# Patient Record
Sex: FEMALE | Race: WHITE | URBAN - METROPOLITAN AREA
[De-identification: names, ages, dates, MRNs, and addresses within clinical notes are randomized per-mention and may not be internally consistent; named-entity substitution may affect disease eponyms.]

---

## 2017-06-23 ENCOUNTER — IMPORTED ENCOUNTER (OUTPATIENT)
Dept: URBAN - METROPOLITAN AREA CLINIC 50 | Facility: CLINIC | Age: 67
End: 2017-06-23

## 2017-10-06 ENCOUNTER — IMPORTED ENCOUNTER (OUTPATIENT)
Dept: URBAN - METROPOLITAN AREA CLINIC 50 | Facility: CLINIC | Age: 67
End: 2017-10-06

## 2017-10-18 ENCOUNTER — IMPORTED ENCOUNTER (OUTPATIENT)
Dept: URBAN - METROPOLITAN AREA CLINIC 50 | Facility: CLINIC | Age: 67
End: 2017-10-18

## 2017-10-23 ENCOUNTER — IMPORTED ENCOUNTER (OUTPATIENT)
Dept: URBAN - METROPOLITAN AREA CLINIC 50 | Facility: CLINIC | Age: 67
End: 2017-10-23

## 2017-11-02 ENCOUNTER — IMPORTED ENCOUNTER (OUTPATIENT)
Dept: URBAN - METROPOLITAN AREA CLINIC 50 | Facility: CLINIC | Age: 67
End: 2017-11-02

## 2018-02-12 ENCOUNTER — IMPORTED ENCOUNTER (OUTPATIENT)
Dept: URBAN - METROPOLITAN AREA CLINIC 50 | Facility: CLINIC | Age: 68
End: 2018-02-12

## 2018-02-27 ENCOUNTER — IMPORTED ENCOUNTER (OUTPATIENT)
Dept: URBAN - METROPOLITAN AREA CLINIC 50 | Facility: CLINIC | Age: 68
End: 2018-02-27

## 2018-03-07 ENCOUNTER — IMPORTED ENCOUNTER (OUTPATIENT)
Dept: URBAN - METROPOLITAN AREA CLINIC 50 | Facility: CLINIC | Age: 68
End: 2018-03-07

## 2018-05-23 ENCOUNTER — IMPORTED ENCOUNTER (OUTPATIENT)
Dept: URBAN - METROPOLITAN AREA CLINIC 50 | Facility: CLINIC | Age: 68
End: 2018-05-23

## 2018-05-23 NOTE — PATIENT DISCUSSION
"""Informed patient that their cataract is visually significant and meets the criteria for cataract ""

## 2018-08-06 ENCOUNTER — IMPORTED ENCOUNTER (OUTPATIENT)
Dept: URBAN - METROPOLITAN AREA CLINIC 50 | Facility: CLINIC | Age: 68
End: 2018-08-06

## 2018-09-06 ENCOUNTER — IMPORTED ENCOUNTER (OUTPATIENT)
Dept: URBAN - METROPOLITAN AREA CLINIC 50 | Facility: CLINIC | Age: 68
End: 2018-09-06

## 2018-09-12 ENCOUNTER — IMPORTED ENCOUNTER (OUTPATIENT)
Dept: URBAN - METROPOLITAN AREA CLINIC 50 | Facility: CLINIC | Age: 68
End: 2018-09-12

## 2018-09-20 ENCOUNTER — IMPORTED ENCOUNTER (OUTPATIENT)
Dept: URBAN - METROPOLITAN AREA CLINIC 50 | Facility: CLINIC | Age: 68
End: 2018-09-20

## 2018-09-20 NOTE — PATIENT DISCUSSION
"""S/P IOL OD: Acrysof SN6AT4 27.5 @ 155Âº (Target: [place]) +Femto +TM. Continue post operative instructions and drops per schedule.  """

## 2018-09-26 ENCOUNTER — IMPORTED ENCOUNTER (OUTPATIENT)
Dept: URBAN - METROPOLITAN AREA CLINIC 50 | Facility: CLINIC | Age: 68
End: 2018-09-26

## 2018-09-26 NOTE — PATIENT DISCUSSION
"""S/P IOL OD: Acrysof SN6AT4 27.5 @ 155Âº (Target: [place]) +Femto +TM. Continue post operative instructions and drops per schedule.  """

## 2018-10-04 ENCOUNTER — IMPORTED ENCOUNTER (OUTPATIENT)
Dept: URBAN - METROPOLITAN AREA CLINIC 50 | Facility: CLINIC | Age: 68
End: 2018-10-04

## 2018-10-04 NOTE — PATIENT DISCUSSION
"""S/P IOL OS: AcrySof SN60WF 26.0 (Target: [place] to +0.30) +Femto/Arcs +Omidria.  Continue ""

## 2018-10-10 ENCOUNTER — IMPORTED ENCOUNTER (OUTPATIENT)
Dept: URBAN - METROPOLITAN AREA CLINIC 50 | Facility: CLINIC | Age: 68
End: 2018-10-10

## 2018-10-10 NOTE — PATIENT DISCUSSION
"""S/P IOL OS: AcrySof SN60WF 26.0 (Target: [place] to +0.30) +Femto/Arcs +Omidria. Continue post operative instructions and drops per schedule.  """

## 2018-10-31 ENCOUNTER — IMPORTED ENCOUNTER (OUTPATIENT)
Dept: URBAN - METROPOLITAN AREA CLINIC 50 | Facility: CLINIC | Age: 68
End: 2018-10-31

## 2018-10-31 NOTE — PATIENT DISCUSSION
"""Discussed dryness affects on vision after cataract surgery.  Recommend blinking and artificial ""

## 2019-01-21 ENCOUNTER — IMPORTED ENCOUNTER (OUTPATIENT)
Dept: URBAN - METROPOLITAN AREA CLINIC 50 | Facility: CLINIC | Age: 69
End: 2019-01-21

## 2019-04-03 ENCOUNTER — IMPORTED ENCOUNTER (OUTPATIENT)
Dept: URBAN - METROPOLITAN AREA CLINIC 50 | Facility: CLINIC | Age: 69
End: 2019-04-03

## 2019-04-03 NOTE — PATIENT DISCUSSION
"""Dryness present; recommend continuing current patient managment. "" ""Continue Restasis both eyes twice a day ."" ""Continue Artificial tears both eyes two - four times a day

## 2019-10-14 ENCOUNTER — IMPORTED ENCOUNTER (OUTPATIENT)
Dept: URBAN - METROPOLITAN AREA CLINIC 50 | Facility: CLINIC | Age: 69
End: 2019-10-14

## 2019-10-23 ENCOUNTER — IMPORTED ENCOUNTER (OUTPATIENT)
Dept: URBAN - METROPOLITAN AREA CLINIC 50 | Facility: CLINIC | Age: 69
End: 2019-10-23

## 2019-10-23 NOTE — PATIENT DISCUSSION
"""Continue Restasis both eyes twice a day ."" ""Continue Artificial tears both eyes two - four times a day ."" ""Continue Omega Vitamins by mouth twice a day

## 2019-11-12 ENCOUNTER — IMPORTED ENCOUNTER (OUTPATIENT)
Dept: URBAN - METROPOLITAN AREA CLINIC 50 | Facility: CLINIC | Age: 69
End: 2019-11-12

## 2020-01-14 ENCOUNTER — IMPORTED ENCOUNTER (OUTPATIENT)
Dept: URBAN - METROPOLITAN AREA CLINIC 50 | Facility: CLINIC | Age: 70
End: 2020-01-14

## 2020-03-16 ENCOUNTER — IMPORTED ENCOUNTER (OUTPATIENT)
Dept: URBAN - METROPOLITAN AREA CLINIC 50 | Facility: CLINIC | Age: 70
End: 2020-03-16

## 2020-05-13 ENCOUNTER — IMPORTED ENCOUNTER (OUTPATIENT)
Dept: URBAN - METROPOLITAN AREA CLINIC 50 | Facility: CLINIC | Age: 70
End: 2020-05-13

## 2020-06-09 ENCOUNTER — IMPORTED ENCOUNTER (OUTPATIENT)
Dept: URBAN - METROPOLITAN AREA CLINIC 50 | Facility: CLINIC | Age: 70
End: 2020-06-09

## 2020-06-12 ENCOUNTER — IMPORTED ENCOUNTER (OUTPATIENT)
Dept: URBAN - METROPOLITAN AREA CLINIC 50 | Facility: CLINIC | Age: 70
End: 2020-06-12

## 2020-06-12 NOTE — PATIENT DISCUSSION
"""OCT OU today. "" ""Monitor ERM for changes. Informed patient of potential for worsening. Instructed patient to call with changes in vision. Recommend regular Amsler grid checks.  """

## 2021-01-21 ENCOUNTER — IMPORTED ENCOUNTER (OUTPATIENT)
Dept: URBAN - METROPOLITAN AREA CLINIC 50 | Facility: CLINIC | Age: 71
End: 2021-01-21

## 2021-01-21 NOTE — PATIENT DISCUSSION
"""Continue Restasis both eyes twice a day ."" ""Increase Preservative free tears both eyes two - three times a day ."" ""Continue Omega Vitamins by mouth twice a day

## 2021-04-17 ASSESSMENT — TONOMETRY
OD_IOP_MMHG: 15
OS_IOP_MMHG: 16
OS_IOP_MMHG: 16
OD_IOP_MMHG: 15
OS_IOP_MMHG: 15
OS_IOP_MMHG: 11
OS_IOP_MMHG: 16
OS_IOP_MMHG: 15
OD_IOP_MMHG: 16
OS_IOP_MMHG: 16
OD_IOP_MMHG: 16
OD_IOP_MMHG: 14
OS_IOP_MMHG: 15
OS_IOP_MMHG: 15
OD_IOP_MMHG: 16
OD_IOP_MMHG: 16
OD_IOP_MMHG: 21
OD_IOP_MMHG: 15
OD_IOP_MMHG: 16
OD_IOP_MMHG: 16
OS_IOP_MMHG: 16
OS_IOP_MMHG: 15
OD_IOP_MMHG: 15
OS_IOP_MMHG: 16
OS_IOP_MMHG: 16
OS_IOP_MMHG: 17
OD_IOP_MMHG: 16
OD_IOP_MMHG: 14
OD_IOP_MMHG: 16

## 2021-04-17 ASSESSMENT — VISUAL ACUITY
OS_SC: 20/20
OD_CC: J3@ 20 IN
OD_SC: 20/40
OD_OTHER: 20/30. 20/60.
OS_OTHER: 20/25. 20/50.
OD_BAT: 20/40
OS_SC: 20/200
OD_CC: J2@ 18 IN
OS_BAT: >20/400
OS_SC: 20/25+
OD_BAT: >20/400
OS_SC: 20/25-2
OS_BAT: >20/400
OD_SC: 20/70
OD_CC: J1+
OD_PH: 20/25+1
OS_CC: 20/20
OD_PH: 20/25-
OS_PH: 20/20
OD_OTHER: >20/400.
OS_SC: 20/25
OS_CC: 20/30Â±2
OD_BAT: >20/400
OD_SC: 20/30
OS_SC: 20/25-1
OD_CC: J1@ 16 IN
OS_PH: @ 18 IN
OD_SC: 20/40+2
OS_CC: J1+
OS_CC: J1+
OS_CC: 20/20
OD_OTHER: 20/40. 20/100.
OS_OTHER: >20/400.
OD_CC: 20/20-
OS_CC: J1@ 16 IN
OD_CC: J1+
OS_BAT: >20/400
OS_CC: 20/30
OS_CC: 20/20
OD_SC: 20/40-
OS_SC: 20/30
OS_SC: 20/200
OS_BAT: >20/400
OD_SC: 20/25-2
OD_SC: 20/50
OD_CC: J1+
OD_CC: 20/20
OD_CC: 20/30+
OD_BAT: 20/30
OD_SC: 20/400
OS_BAT: 20/40
OS_SC: 20/50+2
OD_SC: 20/30-
OD_CC: 20/30
OS_CC: J1+
OS_OTHER: 20/40. 20/100.
OD_SC: 20/25+
OS_OTHER: >20/400. >20/400.
OD_OTHER: >20/400.
OS_OTHER: >20/400. >20/400.
OD_SC: 20/25-1
OS_CC: J2@ 18 IN
OS_PH: 20/25-2
OS_OTHER: >20/400.
OS_BAT: 20/25
OD_BAT: >20/400
OD_PH: 20/25-2
OS_CC: J3@ 20 IN
OD_OTHER: >20/400.
OD_PH: @ 18 IN
OS_SC: 20/50

## 2021-05-19 ENCOUNTER — EMERGENCY VISIT (OUTPATIENT)
Dept: URBAN - METROPOLITAN AREA CLINIC 49 | Facility: CLINIC | Age: 71
End: 2021-05-19

## 2021-05-19 DIAGNOSIS — H43.813: ICD-10-CM

## 2021-05-19 PROCEDURE — 92014 COMPRE OPH EXAM EST PT 1/>: CPT

## 2021-05-19 ASSESSMENT — TONOMETRY
OS_IOP_MMHG: 15
OD_IOP_MMHG: 15

## 2021-05-19 ASSESSMENT — VISUAL ACUITY
OS_SC: 20/25
OD_SC: 20/25

## 2022-02-03 ENCOUNTER — COMPREHENSIVE EXAM (OUTPATIENT)
Dept: URBAN - METROPOLITAN AREA CLINIC 49 | Facility: CLINIC | Age: 72
End: 2022-02-03

## 2022-02-03 DIAGNOSIS — H10.45: ICD-10-CM

## 2022-02-03 DIAGNOSIS — H35.363: ICD-10-CM

## 2022-02-03 DIAGNOSIS — H16.223: ICD-10-CM

## 2022-02-03 PROCEDURE — 92134 CPTRZ OPH DX IMG PST SGM RTA: CPT

## 2022-02-03 PROCEDURE — 92014 COMPRE OPH EXAM EST PT 1/>: CPT

## 2022-02-03 PROCEDURE — 92015 DETERMINE REFRACTIVE STATE: CPT

## 2022-02-03 ASSESSMENT — VISUAL ACUITY
OU_SC: J2@16"
OD_SC: 20/25-2
OD_CC: 20/30+2
OU_CC: J1@16"
OU_SC: 20/25-2
OS_CC: 20/25-2
OS_SC: 20/30+1

## 2022-02-03 ASSESSMENT — TONOMETRY
OS_IOP_MMHG: 14
OD_IOP_MMHG: 13

## 2022-02-03 NOTE — PATIENT DISCUSSION
discussed with patient,  recommend patent start pataday (green label) and use it in both eyes once a day.

## 2024-02-15 ENCOUNTER — COMPREHENSIVE EXAM (OUTPATIENT)
Dept: URBAN - METROPOLITAN AREA CLINIC 49 | Facility: LOCATION | Age: 74
End: 2024-02-15

## 2024-02-15 DIAGNOSIS — H04.123: ICD-10-CM

## 2024-02-15 DIAGNOSIS — H35.373: ICD-10-CM

## 2024-02-15 DIAGNOSIS — H52.4: ICD-10-CM

## 2024-02-15 DIAGNOSIS — H43.813: ICD-10-CM

## 2024-02-15 DIAGNOSIS — H35.363: ICD-10-CM

## 2024-02-15 PROCEDURE — 92134 CPTRZ OPH DX IMG PST SGM RTA: CPT

## 2024-02-15 PROCEDURE — 99214 OFFICE O/P EST MOD 30 MIN: CPT

## 2024-02-15 PROCEDURE — 92015 DETERMINE REFRACTIVE STATE: CPT

## 2024-02-15 ASSESSMENT — VISUAL ACUITY
OD_CC: 20/25
OU_CC: J1+
OS_CC: 20/25-2

## 2024-02-15 ASSESSMENT — TONOMETRY
OS_IOP_MMHG: 16
OD_IOP_MMHG: 15

## 2024-04-28 NOTE — PATIENT DISCUSSION
Recommended artificial tears to use as directed. PAST MEDICAL HISTORY:  Diabetes     High cholesterol     Neuropathy

## 2024-09-05 ENCOUNTER — EMERGENCY VISIT (OUTPATIENT)
Dept: URBAN - METROPOLITAN AREA CLINIC 49 | Facility: LOCATION | Age: 74
End: 2024-09-05

## 2024-09-05 DIAGNOSIS — H04.123: ICD-10-CM

## 2024-09-05 DIAGNOSIS — H43.813: ICD-10-CM

## 2024-09-05 DIAGNOSIS — H35.363: ICD-10-CM

## 2024-09-05 DIAGNOSIS — H52.4: ICD-10-CM

## 2024-09-05 DIAGNOSIS — H35.373: ICD-10-CM

## 2024-09-05 PROCEDURE — 92015 DETERMINE REFRACTIVE STATE: CPT

## 2024-09-05 PROCEDURE — 99214 OFFICE O/P EST MOD 30 MIN: CPT

## 2024-09-05 PROCEDURE — 92134 CPTRZ OPH DX IMG PST SGM RTA: CPT

## 2024-09-25 ENCOUNTER — CONTACT LENSES/GLASSES VISIT (OUTPATIENT)
Dept: URBAN - METROPOLITAN AREA CLINIC 49 | Facility: LOCATION | Age: 74
End: 2024-09-25

## 2024-09-25 DIAGNOSIS — H52.4: ICD-10-CM

## 2024-09-25 PROCEDURE — 92015GRNC REFRACTION GLASSES RECHECK NO CHARGE

## 2025-07-08 ENCOUNTER — COMPREHENSIVE EXAM (OUTPATIENT)
Age: 75
End: 2025-07-08

## 2025-07-08 DIAGNOSIS — H52.13: ICD-10-CM

## 2025-07-08 DIAGNOSIS — H43.813: ICD-10-CM

## 2025-07-08 DIAGNOSIS — H52.203: ICD-10-CM

## 2025-07-08 DIAGNOSIS — H02.831: ICD-10-CM

## 2025-07-08 DIAGNOSIS — H02.834: ICD-10-CM

## 2025-07-08 DIAGNOSIS — H52.4: ICD-10-CM

## 2025-07-08 DIAGNOSIS — H35.373: ICD-10-CM

## 2025-07-08 DIAGNOSIS — H43.823: ICD-10-CM

## 2025-07-08 DIAGNOSIS — H16.223: ICD-10-CM

## 2025-07-08 PROCEDURE — 92134 CPTRZ OPH DX IMG PST SGM RTA: CPT

## 2025-07-08 PROCEDURE — 99214 OFFICE O/P EST MOD 30 MIN: CPT

## 2025-07-08 PROCEDURE — 92015 DETERMINE REFRACTIVE STATE: CPT
